# Patient Record
Sex: MALE | Race: WHITE | Employment: OTHER | URBAN - NONMETROPOLITAN AREA
[De-identification: names, ages, dates, MRNs, and addresses within clinical notes are randomized per-mention and may not be internally consistent; named-entity substitution may affect disease eponyms.]

---

## 2017-06-15 ENCOUNTER — OFFICE VISIT (OUTPATIENT)
Dept: FAMILY MEDICINE CLINIC | Age: 82
End: 2017-06-15
Payer: MEDICARE

## 2017-06-15 VITALS
DIASTOLIC BLOOD PRESSURE: 76 MMHG | SYSTOLIC BLOOD PRESSURE: 110 MMHG | HEIGHT: 68 IN | WEIGHT: 185 LBS | HEART RATE: 80 BPM | BODY MASS INDEX: 28.04 KG/M2

## 2017-06-15 DIAGNOSIS — N40.1 BENIGN PROSTATIC HYPERPLASIA WITH LOWER URINARY TRACT SYMPTOMS, UNSPECIFIED MORPHOLOGY: ICD-10-CM

## 2017-06-15 DIAGNOSIS — L43.9 LICHEN PLANUS: ICD-10-CM

## 2017-06-15 DIAGNOSIS — K27.9 PUD (PEPTIC ULCER DISEASE): Primary | ICD-10-CM

## 2017-06-15 DIAGNOSIS — K27.9 PUD (PEPTIC ULCER DISEASE): ICD-10-CM

## 2017-06-15 DIAGNOSIS — Z12.5 PROSTATE CANCER SCREENING: ICD-10-CM

## 2017-06-15 DIAGNOSIS — Z80.42 FAMILY HISTORY OF PROSTATE CANCER IN FATHER: ICD-10-CM

## 2017-06-15 DIAGNOSIS — K57.30 DIVERTICULOSIS OF LARGE INTESTINE WITHOUT PERFORATION OR ABSCESS WITHOUT BLEEDING: ICD-10-CM

## 2017-06-15 DIAGNOSIS — H61.22 IMPACTED CERUMEN OF LEFT EAR: ICD-10-CM

## 2017-06-15 PROBLEM — N40.0 BPH (BENIGN PROSTATIC HYPERPLASIA): Status: ACTIVE | Noted: 2017-06-15

## 2017-06-15 PROCEDURE — 4040F PNEUMOC VAC/ADMIN/RCVD: CPT | Performed by: FAMILY MEDICINE

## 2017-06-15 PROCEDURE — 99213 OFFICE O/P EST LOW 20 MIN: CPT | Performed by: FAMILY MEDICINE

## 2017-06-15 PROCEDURE — 1123F ACP DISCUSS/DSCN MKR DOCD: CPT | Performed by: FAMILY MEDICINE

## 2017-06-15 PROCEDURE — G8419 CALC BMI OUT NRM PARAM NOF/U: HCPCS | Performed by: FAMILY MEDICINE

## 2017-06-15 PROCEDURE — G8427 DOCREV CUR MEDS BY ELIG CLIN: HCPCS | Performed by: FAMILY MEDICINE

## 2017-06-15 PROCEDURE — 1036F TOBACCO NON-USER: CPT | Performed by: FAMILY MEDICINE

## 2017-06-15 PROCEDURE — 69209 REMOVE IMPACTED EAR WAX UNI: CPT | Performed by: FAMILY MEDICINE

## 2017-06-15 RX ORDER — TERAZOSIN 5 MG/1
5 CAPSULE ORAL NIGHTLY
COMMUNITY
End: 2017-12-19 | Stop reason: SDUPTHER

## 2017-06-15 RX ORDER — OMEPRAZOLE 20 MG/1
20 CAPSULE, DELAYED RELEASE ORAL DAILY
Qty: 90 CAPSULE | Refills: 3 | Status: SHIPPED | OUTPATIENT
Start: 2017-06-15 | End: 2017-12-19 | Stop reason: SDUPTHER

## 2017-06-15 RX ORDER — TRAMADOL HYDROCHLORIDE 50 MG/1
50 TABLET ORAL EVERY 6 HOURS PRN
COMMUNITY
End: 2017-06-15 | Stop reason: ALTCHOICE

## 2017-06-15 RX ORDER — HYDROCODONE BITARTRATE AND ACETAMINOPHEN 5; 325 MG/1; MG/1
1 TABLET ORAL EVERY 6 HOURS PRN
COMMUNITY
End: 2017-06-15 | Stop reason: ALTCHOICE

## 2017-06-15 RX ORDER — ASCORBIC ACID 500 MG
500 TABLET ORAL DAILY
COMMUNITY

## 2017-06-15 RX ORDER — M-VIT,TX,IRON,MINS/CALC/FOLIC 27MG-0.4MG
1 TABLET ORAL DAILY
COMMUNITY

## 2017-06-15 RX ORDER — OMEPRAZOLE 40 MG/1
40 CAPSULE, DELAYED RELEASE ORAL DAILY
Qty: 30 CAPSULE | Status: CANCELLED | OUTPATIENT
Start: 2017-06-15

## 2017-06-15 RX ORDER — CIPROFLOXACIN AND DEXAMETHASONE 3; 1 MG/ML; MG/ML
4 SUSPENSION/ DROPS AURICULAR (OTIC) 2 TIMES DAILY
Qty: 1 BOTTLE | Refills: 0 | Status: SHIPPED | OUTPATIENT
Start: 2017-06-15 | End: 2017-06-22

## 2017-06-15 RX ORDER — FERROUS SULFATE 325(65) MG
325 TABLET ORAL
COMMUNITY
End: 2017-06-15 | Stop reason: ALTCHOICE

## 2017-06-15 RX ORDER — TRIAMCINOLONE ACETONIDE 5 MG/G
CREAM TOPICAL 3 TIMES DAILY
COMMUNITY
End: 2017-06-15 | Stop reason: ALTCHOICE

## 2017-06-15 RX ORDER — OMEPRAZOLE 40 MG/1
40 CAPSULE, DELAYED RELEASE ORAL DAILY
COMMUNITY
End: 2017-06-15 | Stop reason: DRUGHIGH

## 2017-06-15 ASSESSMENT — ENCOUNTER SYMPTOMS
SORE THROAT: 0
ABDOMINAL PAIN: 0
SHORTNESS OF BREATH: 0
ABDOMINAL DISTENTION: 0
CHEST TIGHTNESS: 0
WHEEZING: 0

## 2017-06-15 ASSESSMENT — PATIENT HEALTH QUESTIONNAIRE - PHQ9
SUM OF ALL RESPONSES TO PHQ QUESTIONS 1-9: 0
2. FEELING DOWN, DEPRESSED OR HOPELESS: 0
1. LITTLE INTEREST OR PLEASURE IN DOING THINGS: 0
SUM OF ALL RESPONSES TO PHQ9 QUESTIONS 1 & 2: 0

## 2017-06-23 ENCOUNTER — TELEPHONE (OUTPATIENT)
Dept: FAMILY MEDICINE CLINIC | Age: 82
End: 2017-06-23

## 2017-06-23 DIAGNOSIS — H61.20 IMPACTED CERUMEN, UNSPECIFIED LATERALITY: Primary | ICD-10-CM

## 2017-07-17 ENCOUNTER — OFFICE VISIT (OUTPATIENT)
Dept: OTOLARYNGOLOGY | Age: 82
End: 2017-07-17
Payer: MEDICARE

## 2017-07-17 VITALS
HEIGHT: 68 IN | BODY MASS INDEX: 28.19 KG/M2 | SYSTOLIC BLOOD PRESSURE: 138 MMHG | DIASTOLIC BLOOD PRESSURE: 62 MMHG | WEIGHT: 186 LBS

## 2017-07-17 DIAGNOSIS — J30.0 VASOMOTOR RHINITIS: ICD-10-CM

## 2017-07-17 DIAGNOSIS — H91.93 HEARING LOSS, BILATERAL: ICD-10-CM

## 2017-07-17 DIAGNOSIS — H61.22 CERUMEN DEBRIS ON TYMPANIC MEMBRANE, LEFT: Primary | ICD-10-CM

## 2017-07-17 PROCEDURE — 1123F ACP DISCUSS/DSCN MKR DOCD: CPT | Performed by: OTOLARYNGOLOGY

## 2017-07-17 PROCEDURE — 99202 OFFICE O/P NEW SF 15 MIN: CPT | Performed by: OTOLARYNGOLOGY

## 2017-07-17 PROCEDURE — 4040F PNEUMOC VAC/ADMIN/RCVD: CPT | Performed by: OTOLARYNGOLOGY

## 2017-07-17 PROCEDURE — G8419 CALC BMI OUT NRM PARAM NOF/U: HCPCS | Performed by: OTOLARYNGOLOGY

## 2017-07-17 PROCEDURE — 1036F TOBACCO NON-USER: CPT | Performed by: OTOLARYNGOLOGY

## 2017-07-17 PROCEDURE — G8427 DOCREV CUR MEDS BY ELIG CLIN: HCPCS | Performed by: OTOLARYNGOLOGY

## 2017-07-17 RX ORDER — FLUTICASONE PROPIONATE 50 MCG
2 SPRAY, SUSPENSION (ML) NASAL DAILY
Qty: 1 BOTTLE | Refills: 5 | Status: SHIPPED | OUTPATIENT
Start: 2017-07-17 | End: 2018-06-19 | Stop reason: CLARIF

## 2017-12-19 ENCOUNTER — OFFICE VISIT (OUTPATIENT)
Dept: FAMILY MEDICINE CLINIC | Age: 82
End: 2017-12-19
Payer: MEDICARE

## 2017-12-19 VITALS
SYSTOLIC BLOOD PRESSURE: 136 MMHG | HEART RATE: 76 BPM | DIASTOLIC BLOOD PRESSURE: 68 MMHG | BODY MASS INDEX: 27.37 KG/M2 | WEIGHT: 180 LBS

## 2017-12-19 DIAGNOSIS — L43.9 LICHEN PLANUS: ICD-10-CM

## 2017-12-19 DIAGNOSIS — Z12.5 SCREENING PSA (PROSTATE SPECIFIC ANTIGEN): ICD-10-CM

## 2017-12-19 DIAGNOSIS — Z00.00 EXAMINATION, MEDICAL, GENERAL: ICD-10-CM

## 2017-12-19 DIAGNOSIS — N40.0 BENIGN PROSTATIC HYPERPLASIA, UNSPECIFIED WHETHER LOWER URINARY TRACT SYMPTOMS PRESENT: ICD-10-CM

## 2017-12-19 DIAGNOSIS — Z23 NEED FOR INFLUENZA VACCINATION: ICD-10-CM

## 2017-12-19 DIAGNOSIS — Z80.42 FAMILY HISTORY OF PROSTATE CANCER IN FATHER: ICD-10-CM

## 2017-12-19 DIAGNOSIS — K27.9 PUD (PEPTIC ULCER DISEASE): Primary | ICD-10-CM

## 2017-12-19 DIAGNOSIS — Z13.220 SCREENING, LIPID: ICD-10-CM

## 2017-12-19 DIAGNOSIS — K57.30 DIVERTICULOSIS OF LARGE INTESTINE WITHOUT PERFORATION OR ABSCESS WITHOUT BLEEDING: ICD-10-CM

## 2017-12-19 PROCEDURE — G8417 CALC BMI ABV UP PARAM F/U: HCPCS | Performed by: FAMILY MEDICINE

## 2017-12-19 PROCEDURE — 1036F TOBACCO NON-USER: CPT | Performed by: FAMILY MEDICINE

## 2017-12-19 PROCEDURE — G8484 FLU IMMUNIZE NO ADMIN: HCPCS | Performed by: FAMILY MEDICINE

## 2017-12-19 PROCEDURE — G8427 DOCREV CUR MEDS BY ELIG CLIN: HCPCS | Performed by: FAMILY MEDICINE

## 2017-12-19 PROCEDURE — 90662 IIV NO PRSV INCREASED AG IM: CPT | Performed by: FAMILY MEDICINE

## 2017-12-19 PROCEDURE — 1123F ACP DISCUSS/DSCN MKR DOCD: CPT | Performed by: FAMILY MEDICINE

## 2017-12-19 PROCEDURE — 4040F PNEUMOC VAC/ADMIN/RCVD: CPT | Performed by: FAMILY MEDICINE

## 2017-12-19 PROCEDURE — 99214 OFFICE O/P EST MOD 30 MIN: CPT | Performed by: FAMILY MEDICINE

## 2017-12-19 PROCEDURE — G0008 ADMIN INFLUENZA VIRUS VAC: HCPCS | Performed by: FAMILY MEDICINE

## 2017-12-19 RX ORDER — TERAZOSIN 5 MG/1
5 CAPSULE ORAL NIGHTLY
Qty: 90 CAPSULE | Refills: 3 | Status: SHIPPED | OUTPATIENT
Start: 2017-12-19 | End: 2018-12-20 | Stop reason: SDUPTHER

## 2017-12-19 RX ORDER — FINASTERIDE 5 MG/1
1 TABLET, FILM COATED ORAL
COMMUNITY
End: 2017-12-19 | Stop reason: CLARIF

## 2017-12-19 RX ORDER — BIOTIN 5 MG
1 TABLET ORAL
COMMUNITY

## 2017-12-19 RX ORDER — OMEPRAZOLE 20 MG/1
20 CAPSULE, DELAYED RELEASE ORAL DAILY
Qty: 90 CAPSULE | Refills: 3 | Status: SHIPPED | OUTPATIENT
Start: 2017-12-19 | End: 2018-03-28 | Stop reason: SDUPTHER

## 2017-12-19 RX ORDER — VITAMIN E 268 MG
1 CAPSULE ORAL
COMMUNITY
End: 2018-06-19 | Stop reason: CLARIF

## 2017-12-19 RX ORDER — ASPIRIN 81 MG/1
1 TABLET, CHEWABLE ORAL
COMMUNITY
End: 2018-06-19 | Stop reason: CLARIF

## 2017-12-19 ASSESSMENT — ENCOUNTER SYMPTOMS
SORE THROAT: 0
ABDOMINAL DISTENTION: 0
SHORTNESS OF BREATH: 0
WHEEZING: 0
ABDOMINAL PAIN: 0
CHEST TIGHTNESS: 0

## 2017-12-19 NOTE — PROGRESS NOTES
[address] [address] [address]  Dept: [phone]  Dept Fax: [phone]    Dante Archer is a 80 y.o. male who presents today for his medical conditions/complaints as noted below. Dante Archer is c/o of 6 Month Follow-Up; Benign Prostatic Hypertrophy (denies urinary frequency or urgency,urinary hesitancy.  nocturia twice a night); and Peptic Ulcer Disease (denies abd pain, nausea or vomiting, heartburn, rectal bleeding)      HPI:     HPI   Patient comes to office for a routine 6 month check up    PUD  Had a history of a bleeding ulcer about 3 years back  No pain  No bleeding or melanotic stools  Still taking omeprazole  No heartburn ; no dysphagia    Lichen planus  Proven by biopsy in past  No longer sees oralmaxillary surgery   No treatment ;though he has used a topical steroid in the past    Osteoarthritis  No longer sees orthopedics  Says he's fine   Though occasionally has some trigger fingers   BPH   Only taking hytrin; stopped finasteride a while back   No significant nocturia; no hesitancy, urgency, frequency    BP Readings from Last 3 Encounters:   12/19/17 136/68   07/17/17 138/62   06/15/17 110/76            (goal 120/80)    Past Medical History:   Diagnosis Date    BPH (benign prostatic hyperplasia)     Diverticulosis     Fracture of left hip (HCC)     Leukoplakia of oral mucosa     Microscopic hematuria     PUD (peptic ulcer disease)       Past Surgical History:   Procedure Laterality Date    BUNIONECTOMY      CARDIAC CATHETERIZATION  2008    CATARACT REMOVAL  2010    COLONOSCOPY  2005    severe diverticulosis    COLONOSCOPY  07/2014    severe diverticulosis and GI bleed    CYSTOURETHROSCOPY  2006    FOOT SURGERY  1998    OTHER SURGICAL HISTORY  2008    biopsy-leukoplakia-buccal mucosa    REVISION TOTAL KNEE ARTHROPLASTY  2014    TOTAL KNEE ARTHROPLASTY Right 10/2014    TOTAL KNEE ARTHROPLASTY Left 01/2016    UPPER

## 2018-03-28 DIAGNOSIS — K27.9 PUD (PEPTIC ULCER DISEASE): ICD-10-CM

## 2018-03-28 RX ORDER — OMEPRAZOLE 20 MG/1
20 CAPSULE, DELAYED RELEASE ORAL DAILY
Qty: 30 CAPSULE | Refills: 5 | Status: SHIPPED | OUTPATIENT
Start: 2018-03-28 | End: 2018-12-20 | Stop reason: SDUPTHER

## 2018-06-15 LAB
A/G RATIO: 1.3 RATIO
AGE FOR GFR: 90
ALBUMIN: 3.5 G/DL
ALK PHOSPHATASE: 76 UNITS/L
ALT SERPL-CCNC: 27 UNITS/L
ANION GAP SERPL CALCULATED.3IONS-SCNC: 8 MMOL/L
AST SERPL-CCNC: 22 UNITS/L
BASOPHILS # BLD: 0.04 THOU/MM3
BILIRUB SERPL-MCNC: 0.8 MG/DL
BUN BLDV-MCNC: 14 MG/DL
CALCIUM SERPL-MCNC: 8.9 MG/DL
CHLORIDE BLD-SCNC: 107 MMOL/L
CHOLESTEROL/HDL RATIO: 2.6 RATIO
CHOLESTEROL: 158 MG/DL
CO2: 28 MMOL/L
CREAT SERPL-MCNC: 0.8 MG/DL
DIFFERENTIAL: AUTOMATED DIFF
EGFR BF: 82 ML/MIN/1.73 M2
EGFR BM: 110 ML/MIN/1.73 M2
EGFR WF: 67 ML/MIN/1.73 M2
EGFR WM: 91 ML/MIN/1.73 M2
EOSINOPHIL # BLD: 0.09 THOU/MM3
GLOBULIN: 2.6 G/DL
GLUCOSE: 99 MG/DL
HCT VFR BLD CALC: 36.4 %
HDL, DIRECT: 60 MG/DL
HEMOGLOBIN: 13.3 G/DL
LDL CHOLESTEROL CALCULATED: 87.2 MG/DL
LYMPHOCYTES # BLD: 1.09 THOU/MM3
MCH RBC QN AUTO: 33.9 PG
MCHC RBC AUTO-ENTMCNC: 36.6 G/DL
MCV RBC AUTO: 92.6 FL
MONOCYTES # BLD: 0.42 THOU/MM3
NEUTROPHILS: 2.88 THOU/MM3
PDW BLD-RTO: 11.9 %
PLATELET # BLD: 183 THOU/MM3
PMV BLD AUTO: 6.7 FL
POTASSIUM SERPL-SCNC: 4.3 MMOL/L
RBC # BLD: 3.93 M/UL
SCREENING PSA: 1.98 NG/ML
SODIUM BLD-SCNC: 139 MMOL/L
TOTAL PROTEIN: 6.1 G/DL
TRIGL SERPL-MCNC: 54 MG/DL
VLDLC SERPL CALC-MCNC: 11 MG/DL
WBC # BLD: 4.53 THOU/ML3

## 2018-06-19 ENCOUNTER — OFFICE VISIT (OUTPATIENT)
Dept: FAMILY MEDICINE CLINIC | Age: 83
End: 2018-06-19
Payer: MEDICARE

## 2018-06-19 VITALS
HEART RATE: 64 BPM | DIASTOLIC BLOOD PRESSURE: 80 MMHG | BODY MASS INDEX: 26.91 KG/M2 | SYSTOLIC BLOOD PRESSURE: 120 MMHG | WEIGHT: 177 LBS

## 2018-06-19 DIAGNOSIS — Z80.42 FAMILY HISTORY OF PROSTATE CANCER IN FATHER: ICD-10-CM

## 2018-06-19 DIAGNOSIS — K27.9 PUD (PEPTIC ULCER DISEASE): Primary | ICD-10-CM

## 2018-06-19 DIAGNOSIS — K21.9 GASTROESOPHAGEAL REFLUX DISEASE, ESOPHAGITIS PRESENCE NOT SPECIFIED: ICD-10-CM

## 2018-06-19 DIAGNOSIS — L43.9 LICHEN PLANUS: ICD-10-CM

## 2018-06-19 DIAGNOSIS — N40.0 BENIGN PROSTATIC HYPERPLASIA, UNSPECIFIED WHETHER LOWER URINARY TRACT SYMPTOMS PRESENT: ICD-10-CM

## 2018-06-19 PROCEDURE — 1036F TOBACCO NON-USER: CPT | Performed by: FAMILY MEDICINE

## 2018-06-19 PROCEDURE — 1123F ACP DISCUSS/DSCN MKR DOCD: CPT | Performed by: FAMILY MEDICINE

## 2018-06-19 PROCEDURE — 99213 OFFICE O/P EST LOW 20 MIN: CPT | Performed by: FAMILY MEDICINE

## 2018-06-19 PROCEDURE — 4040F PNEUMOC VAC/ADMIN/RCVD: CPT | Performed by: FAMILY MEDICINE

## 2018-06-19 PROCEDURE — G8417 CALC BMI ABV UP PARAM F/U: HCPCS | Performed by: FAMILY MEDICINE

## 2018-06-19 PROCEDURE — G8427 DOCREV CUR MEDS BY ELIG CLIN: HCPCS | Performed by: FAMILY MEDICINE

## 2018-06-19 ASSESSMENT — ENCOUNTER SYMPTOMS
CHEST TIGHTNESS: 0
SHORTNESS OF BREATH: 0
ABDOMINAL PAIN: 0
ABDOMINAL DISTENTION: 0
WHEEZING: 0
SORE THROAT: 0

## 2018-06-19 ASSESSMENT — PATIENT HEALTH QUESTIONNAIRE - PHQ9
1. LITTLE INTEREST OR PLEASURE IN DOING THINGS: 0
SUM OF ALL RESPONSES TO PHQ QUESTIONS 1-9: 0
2. FEELING DOWN, DEPRESSED OR HOPELESS: 0
SUM OF ALL RESPONSES TO PHQ9 QUESTIONS 1 & 2: 0

## 2018-08-15 ENCOUNTER — OFFICE VISIT (OUTPATIENT)
Dept: OTOLARYNGOLOGY | Age: 83
End: 2018-08-15
Payer: MEDICARE

## 2018-08-15 VITALS
DIASTOLIC BLOOD PRESSURE: 70 MMHG | HEART RATE: 72 BPM | BODY MASS INDEX: 25.34 KG/M2 | WEIGHT: 177 LBS | HEIGHT: 70 IN | SYSTOLIC BLOOD PRESSURE: 110 MMHG | RESPIRATION RATE: 14 BRPM

## 2018-08-15 DIAGNOSIS — R49.0 DYSPHONIA: ICD-10-CM

## 2018-08-15 DIAGNOSIS — H61.22 CERUMEN DEBRIS ON TYMPANIC MEMBRANE, LEFT: Primary | ICD-10-CM

## 2018-08-15 DIAGNOSIS — J30.0 VASOMOTOR RHINITIS: ICD-10-CM

## 2018-08-15 DIAGNOSIS — J38.7 PRESBYLARYNX: ICD-10-CM

## 2018-08-15 DIAGNOSIS — H91.90 HEARING LOSS, UNSPECIFIED HEARING LOSS TYPE, UNSPECIFIED LATERALITY: ICD-10-CM

## 2018-08-15 PROCEDURE — 31575 DIAGNOSTIC LARYNGOSCOPY: CPT | Performed by: OTOLARYNGOLOGY

## 2018-09-11 ENCOUNTER — NURSE ONLY (OUTPATIENT)
Dept: FAMILY MEDICINE CLINIC | Age: 83
End: 2018-09-11
Payer: MEDICARE

## 2018-09-11 DIAGNOSIS — Z23 NEED FOR INFLUENZA VACCINATION: Primary | ICD-10-CM

## 2018-09-11 PROCEDURE — G0008 ADMIN INFLUENZA VIRUS VAC: HCPCS | Performed by: FAMILY MEDICINE

## 2018-09-11 PROCEDURE — 90662 IIV NO PRSV INCREASED AG IM: CPT | Performed by: FAMILY MEDICINE

## 2018-12-20 ENCOUNTER — OFFICE VISIT (OUTPATIENT)
Dept: FAMILY MEDICINE CLINIC | Age: 83
End: 2018-12-20
Payer: MEDICARE

## 2018-12-20 VITALS
HEART RATE: 79 BPM | WEIGHT: 174 LBS | DIASTOLIC BLOOD PRESSURE: 78 MMHG | SYSTOLIC BLOOD PRESSURE: 130 MMHG | BODY MASS INDEX: 24.91 KG/M2 | OXYGEN SATURATION: 98 % | HEIGHT: 70 IN

## 2018-12-20 DIAGNOSIS — K27.9 PUD (PEPTIC ULCER DISEASE): Primary | ICD-10-CM

## 2018-12-20 DIAGNOSIS — Z13.220 SCREENING, LIPID: ICD-10-CM

## 2018-12-20 DIAGNOSIS — Z80.42 FAMILY HISTORY OF PROSTATE CANCER IN FATHER: ICD-10-CM

## 2018-12-20 DIAGNOSIS — N40.0 BENIGN PROSTATIC HYPERPLASIA, UNSPECIFIED WHETHER LOWER URINARY TRACT SYMPTOMS PRESENT: ICD-10-CM

## 2018-12-20 DIAGNOSIS — K21.9 GASTROESOPHAGEAL REFLUX DISEASE, ESOPHAGITIS PRESENCE NOT SPECIFIED: ICD-10-CM

## 2018-12-20 DIAGNOSIS — Z12.5 SCREENING PSA (PROSTATE SPECIFIC ANTIGEN): ICD-10-CM

## 2018-12-20 DIAGNOSIS — L43.9 LICHEN PLANUS: ICD-10-CM

## 2018-12-20 PROCEDURE — 1101F PT FALLS ASSESS-DOCD LE1/YR: CPT | Performed by: FAMILY MEDICINE

## 2018-12-20 PROCEDURE — 1123F ACP DISCUSS/DSCN MKR DOCD: CPT | Performed by: FAMILY MEDICINE

## 2018-12-20 PROCEDURE — G8427 DOCREV CUR MEDS BY ELIG CLIN: HCPCS | Performed by: FAMILY MEDICINE

## 2018-12-20 PROCEDURE — G8482 FLU IMMUNIZE ORDER/ADMIN: HCPCS | Performed by: FAMILY MEDICINE

## 2018-12-20 PROCEDURE — G8420 CALC BMI NORM PARAMETERS: HCPCS | Performed by: FAMILY MEDICINE

## 2018-12-20 PROCEDURE — 1036F TOBACCO NON-USER: CPT | Performed by: FAMILY MEDICINE

## 2018-12-20 PROCEDURE — 4040F PNEUMOC VAC/ADMIN/RCVD: CPT | Performed by: FAMILY MEDICINE

## 2018-12-20 PROCEDURE — 99214 OFFICE O/P EST MOD 30 MIN: CPT | Performed by: FAMILY MEDICINE

## 2018-12-20 RX ORDER — TERAZOSIN 5 MG/1
5 CAPSULE ORAL NIGHTLY
Qty: 90 CAPSULE | Refills: 3 | Status: SHIPPED | OUTPATIENT
Start: 2018-12-20 | End: 2019-12-12 | Stop reason: SDUPTHER

## 2018-12-20 RX ORDER — OMEPRAZOLE 20 MG/1
20 CAPSULE, DELAYED RELEASE ORAL DAILY
Qty: 30 CAPSULE | Refills: 5 | Status: SHIPPED | OUTPATIENT
Start: 2018-12-20 | End: 2019-04-29 | Stop reason: SDUPTHER

## 2018-12-26 NOTE — PROGRESS NOTES
both negative bilaterally. Brady's test is positive bilaterally. He has frozen shoulders bilaterally, a little bit more so on the right than the left. It cannot raise past 90 degrees. He has a lot of difficulty with external rotation also. Lymphadenopathy:     He has no cervical adenopathy. He has no axillary adenopathy. Skin: No rash noted. Vitals reviewed. Assessment:      Diagnosis Orders   1. Family history of prostate cancer in father     2. PUD (peptic ulcer disease)  omeprazole (PRILOSEC) 20 MG delayed release capsule    CBC Auto Differential   3. Benign prostatic hyperplasia, unspecified whether lower urinary tract symptoms present  terazosin (HYTRIN) 5 MG capsule   4. Lichen planus     5. Gastroesophageal reflux disease, esophagitis presence not specified  Comprehensive Metabolic Panel, Fasting   6. Screening PSA (prostate specific antigen)  PSA Screening   7. Screening, lipid  Lipid Panel        POC Testing Results (If Applicable):  No results found for this visit on 12/20/18. Plan: At this time, his current medications are continued. We will get a CBC, comprehensive metabolic profile, lipid profile, and PSA in 6 months. He drinks a lot of coffee. I suggest that he try to increase fluids as the only reason that might cause his cramping due to some dehydration though that is dubious. There is nothing much more to be done for his shoulders unless he elects to. I would not put him under exam under anesthesia at this age. He functions very well. Recheck in 6 months.     Orders Given:  Orders Placed This Encounter   Procedures    Comprehensive Metabolic Panel, Fasting     Standing Status:   Future     Standing Expiration Date:   12/20/2019    Lipid Panel     Standing Status:   Future     Standing Expiration Date:   12/20/2019     Order Specific Question:   Is Patient Fasting?/# of Hours     Answer:   Yes, 12 hours    CBC Auto Differential     Standing Status:   Future

## 2018-12-27 ASSESSMENT — ENCOUNTER SYMPTOMS
CHEST TIGHTNESS: 0
ABDOMINAL DISTENTION: 0
SHORTNESS OF BREATH: 0
ABDOMINAL PAIN: 0
WHEEZING: 0
BLOOD IN STOOL: 0

## 2019-04-29 DIAGNOSIS — K27.9 PUD (PEPTIC ULCER DISEASE): ICD-10-CM

## 2019-04-29 RX ORDER — OMEPRAZOLE 20 MG/1
20 CAPSULE, DELAYED RELEASE ORAL DAILY
Qty: 30 CAPSULE | Refills: 0 | Status: SHIPPED | OUTPATIENT
Start: 2019-04-29 | End: 2019-06-21 | Stop reason: SDUPTHER

## 2019-04-29 NOTE — TELEPHONE ENCOUNTER
Pillo Askew is calling to request a refill on the following medication(s):  Requested Prescriptions     Pending Prescriptions Disp Refills    omeprazole (PRILOSEC) 20 MG delayed release capsule 30 capsule 0     Sig: Take 1 capsule by mouth daily       Last Visit Date (If Applicable):  24/12/8476    Next Visit Date:    6/21/2019

## 2019-06-18 LAB
A/G RATIO: 1.3 RATIO
AGE FOR GFR: 91
ALBUMIN: 3.7 G/DL (ref 3.5–5)
ALK PHOSPHATASE: 91 UNITS/L (ref 38–126)
ALT SERPL-CCNC: 15 UNITS/L (ref 21–72)
ANION GAP SERPL CALCULATED.3IONS-SCNC: 10 MMOL/L
AST SERPL-CCNC: 22 UNITS/L (ref 17–59)
BASOPHILS # BLD: 0.05 THOU/MM3 (ref 0–0.3)
BILIRUB SERPL-MCNC: 0.5 MG/DL (ref 0.2–1.3)
BUN BLDV-MCNC: 16 MG/DL (ref 9–20)
CALCIUM SERPL-MCNC: 9 MG/DL (ref 8.4–10.2)
CHLORIDE BLD-SCNC: 103 MMOL/L (ref 98–120)
CHOLESTEROL/HDL RATIO: 2.9 RATIO (ref 0–4.5)
CHOLESTEROL: 162 MG/DL (ref 50–200)
CO2: 28 MMOL/L (ref 22–31)
CREAT SERPL-MCNC: 0.7 MG/DL (ref 0.7–1.3)
DIFFERENTIAL: AUTOMATED DIFF
EGFR BF: 95 ML/MIN/1.73 M2
EGFR BM: 128 ML/MIN/1.73 M2
EGFR WF: 78 ML/MIN/1.73 M2
EGFR WM: 106 ML/MIN/1.73 M2
EOSINOPHIL # BLD: 0.17 THOU/MM3 (ref 0–1.1)
GLOBULIN: 2.8 G/DL
GLUCOSE: 105 MG/DL (ref 75–110)
HCT VFR BLD CALC: 37.8 % (ref 42–52)
HDL, DIRECT: 55 MG/DL (ref 36–68)
HEMOGLOBIN: 13 G/DL (ref 13.8–17)
LDL CHOLESTEROL CALCULATED: 93.8 MG/DL (ref 0–160)
LYMPHOCYTES # BLD: 1.19 THOU/MM3 (ref 1–5.5)
MCH RBC QN AUTO: 31.2 PG (ref 28.5–32)
MCHC RBC AUTO-ENTMCNC: 34.3 G/DL (ref 32–37)
MCV RBC AUTO: 91.1 FL (ref 80–94)
MONOCYTES # BLD: 0.43 THOU/MM3 (ref 0.1–1)
NEUTROPHILS: 4.08 THOU/MM3 (ref 2–8.1)
PDW BLD-RTO: 11.5 % (ref 10–15)
PLATELET # BLD: 302 THOU/MM3 (ref 130–400)
PMV BLD AUTO: 5.8 FL (ref 7.4–11)
POTASSIUM SERPL-SCNC: 4.7 MMOL/L (ref 3.6–5)
RBC # BLD: 4.15 M/UL (ref 4.7–6.1)
SCREENING PSA: 2.47 NG/ML (ref 0–4)
SODIUM BLD-SCNC: 136 MMOL/L (ref 135–145)
TOTAL PROTEIN: 6.5 G/DL (ref 6.3–8.2)
TRIGL SERPL-MCNC: 66 MG/DL (ref 10–250)
VLDLC SERPL CALC-MCNC: 13 MG/DL (ref 0–40)
WBC # BLD: 5.91 THOU/ML3 (ref 4.8–10)

## 2019-06-21 ENCOUNTER — OFFICE VISIT (OUTPATIENT)
Dept: FAMILY MEDICINE CLINIC | Age: 84
End: 2019-06-21
Payer: MEDICARE

## 2019-06-21 VITALS
OXYGEN SATURATION: 98 % | BODY MASS INDEX: 25.25 KG/M2 | WEIGHT: 176 LBS | SYSTOLIC BLOOD PRESSURE: 110 MMHG | HEART RATE: 104 BPM | DIASTOLIC BLOOD PRESSURE: 60 MMHG

## 2019-06-21 DIAGNOSIS — K27.9 PUD (PEPTIC ULCER DISEASE): ICD-10-CM

## 2019-06-21 DIAGNOSIS — Z80.42 FAMILY HISTORY OF PROSTATE CANCER IN FATHER: ICD-10-CM

## 2019-06-21 DIAGNOSIS — N40.0 BENIGN PROSTATIC HYPERPLASIA, UNSPECIFIED WHETHER LOWER URINARY TRACT SYMPTOMS PRESENT: Primary | ICD-10-CM

## 2019-06-21 DIAGNOSIS — L43.9 LICHEN PLANUS: ICD-10-CM

## 2019-06-21 PROCEDURE — 1123F ACP DISCUSS/DSCN MKR DOCD: CPT | Performed by: FAMILY MEDICINE

## 2019-06-21 PROCEDURE — G8419 CALC BMI OUT NRM PARAM NOF/U: HCPCS | Performed by: FAMILY MEDICINE

## 2019-06-21 PROCEDURE — G8427 DOCREV CUR MEDS BY ELIG CLIN: HCPCS | Performed by: FAMILY MEDICINE

## 2019-06-21 PROCEDURE — 99214 OFFICE O/P EST MOD 30 MIN: CPT | Performed by: FAMILY MEDICINE

## 2019-06-21 PROCEDURE — 4040F PNEUMOC VAC/ADMIN/RCVD: CPT | Performed by: FAMILY MEDICINE

## 2019-06-21 PROCEDURE — 1036F TOBACCO NON-USER: CPT | Performed by: FAMILY MEDICINE

## 2019-06-21 RX ORDER — OMEPRAZOLE 20 MG/1
20 CAPSULE, DELAYED RELEASE ORAL DAILY
Qty: 90 CAPSULE | Refills: 3 | Status: SHIPPED | OUTPATIENT
Start: 2019-06-21 | End: 2019-12-12 | Stop reason: SDUPTHER

## 2019-06-21 ASSESSMENT — PATIENT HEALTH QUESTIONNAIRE - PHQ9
SUM OF ALL RESPONSES TO PHQ QUESTIONS 1-9: 0
1. LITTLE INTEREST OR PLEASURE IN DOING THINGS: 0
SUM OF ALL RESPONSES TO PHQ9 QUESTIONS 1 & 2: 0
SUM OF ALL RESPONSES TO PHQ QUESTIONS 1-9: 0
2. FEELING DOWN, DEPRESSED OR HOPELESS: 0

## 2019-06-21 ASSESSMENT — ENCOUNTER SYMPTOMS
RHINORRHEA: 1
SINUS PAIN: 0
SORE THROAT: 0
SINUS PRESSURE: 0
BLOOD IN STOOL: 0
TROUBLE SWALLOWING: 0
ABDOMINAL PAIN: 0
WHEEZING: 0
BACK PAIN: 0
SHORTNESS OF BREATH: 0
COUGH: 0

## 2019-06-21 NOTE — PROGRESS NOTES
[address]  Dept: [phone]  DeptFax: [phone]    Angella Patricia is a80 y.o. male who presents today for his medical conditions/complaints as noted below some of his problems  Angella Patricia is c/o of 6 Month Follow-Up (pt c/o having muscle spasms in hands worst in left, and when urinates says notices granule like residue in toilet water says happens when more active)      HPI:     HPI   Patient is a 80-year-old actually 80-year-old male comes in for a routine six-month checkup. His problems include and his complaints include    Peptic ulcer disease/GI bleed/GERD  Having suffered a GI bleed 10 years ago. Anoscopy is proved healing in the past.  Remains on omeprazole and needs a refill. He denies heartburn. Denies dysphagia. He has no stomach pain. We repeated his CBC prior to this visit with mild anemia 13. And a low RBC count. No microcytic indices. He has no melena or blood per rectum. BPH and family history of prostate cancer in father  To treat his BPH with Hytrin. His symptoms seem to be well controlled. Nocturia once or twice a night. Stream is about the same. His PSA was 2.47. Up slightly from a year ago when it was 1.98. Complaining of back pain really. No hematuria. He does complain that when he urinates he notices that there is some sort of  \"film\" or debris on top of the toilet water. No blood. Nothing sinks. No stones. May be a little bit more prominent after activity. .  Lichen planus  Is a history of this in the past.  He notices no oral lesions. He has never smoked or chewed tobacco use smokeless tobacco.    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 We noticed this his last visit in December. He has very limited range of motion of the shoulders particularly abduction. More so on the right. He did fall for 5 years ago injured his shoulder.   Had some x-rays which I could not find.  May be his shoulder is been worse since then. He also complains that his hands cramp. And that he has a lot of postnasal drip. That he seems to cough up in the morning. He is tried some steroid nasal sprays. With no success. Most recent lipid panel films have a total cholesterol 162 HDL 59 LDL 93. Not on any medication. His fasting glucose was 105. CBC showed mild anemia with a hemoglobin of 13. Hematocrit 37.8.     BP Readings from Last 3 Encounters:   19 110/60   18 130/78   08/15/18 110/70            (goal 120/80)    Past Medical History:   Diagnosis Date    BPH (benign prostatic hyperplasia)     Diverticulosis     Fracture of left hip (HCC)     Leukoplakia of oral mucosa     Microscopic hematuria     PUD (peptic ulcer disease)       Past Surgical History:   Procedure Laterality Date    BUNIONECTOMY      CARDIAC CATHETERIZATION      CATARACT REMOVAL      COLONOSCOPY      severe diverticulosis    COLONOSCOPY  2014    severe diverticulosis and GI bleed    CYSTOURETHROSCOPY  2006   [place] [address]    OTHER SURGICAL HISTORY      biopsy-leukoplakia-buccal mucosa    REVISION TOTAL KNEE ARTHROPLASTY      TOTAL KNEE ARTHROPLASTY Right 10/2014    TOTAL KNEE ARTHROPLASTY Left 2016    UPPER GASTROINTESTINAL ENDOSCOPY  2009    esophageal ulcer healed excision external hemorrhoids    UPPER GASTROINTESTINAL ENDOSCOPY  10/2014     Family History   Problem Relation Age of Onset    Cancer Father         pancreas    Cancer Brother         prostate     Social History     Tobacco Use    Smoking status: Former Smoker     Last attempt to quit: 6/15/1987     Years since quittin.0    Smokeless tobacco: Former User     Quit date: 1997   Substance Use Topics    Alcohol use: No        Current Outpatient Medications   Medication Sig Dispense Refill    omeprazole (PRILOSEC) 20 MG delayed release capsule Take 1 capsule by mouth daily 90 capsule 3    terazosin (HYTRIN) 5 MG capsule Take 1 capsule by mouth nightly 90 capsule 3    Misc Natural Products (GLUCOSAMINE CHONDROITIN ADV) TABS Take 1 tablet by mouth      calcium citrate-vitamin d 250-200 MG-UNIT TABS Take 1 tablet by mouth      Ascorbic Acid (VITAMIN C) 500 MG tablet Take 500 mg by mouth daily      Multiple Vitamins-Minerals (THERAPEUTIC MULTIVITAMIN-MINERALS) tablet Take 1 tablet by mouth daily       No current facility-administered medications for this visit. Allergies   Allergen Reactions    Percocet [Oxycodone-Acetaminophen]      Nausea         Health Maintenance   Topic Date Due    Annual Wellness Visit (AWV)  05/01/1991    DTaP/Tdap/Td vaccine (1 - Tdap) 11/17/2007    Shingles Vaccine (3 of 3) 12/05/2018    Flu vaccine  Completed    Pneumococcal 65+ years Vaccine  Completed       Lab Results   Component Value Date    K 4.7 06/18/2019    CREATININE 0.7 06/18/2019    AST 22 06/18/2019    ALT 15 06/18/2019    HCT 37.8 06/18/2019    GLUCOSE 105 06/18/2019    CALCIUM 9.0 06/18/2019      Lab Results   Component Value Date    CHOL 162 06/18/2019    TRIG 66 06/18/2019       Subjective:      Review of Systems   Constitutional: Negative for activity change, appetite change, diaphoresis and fever. HENT: Positive for congestion, postnasal drip and rhinorrhea. Negative for sinus pressure, sinus pain, sore throat and trouble swallowing. Eyes: Negative for visual disturbance. Respiratory: Negative for cough, shortness of breath and wheezing. Cardiovascular: Negative for chest pain, palpitations and leg swelling. Gastrointestinal: Negative for abdominal pain and blood in stool. Genitourinary: Negative for difficulty urinating, dysuria, flank pain, frequency, hematuria and urgency. Musculoskeletal: Positive for arthralgias. Negative for back pain and gait problem. Neurological: Negative. Psychiatric/Behavioral: Negative.         Objective:     /60   Pulse 104   Wt 176 lb (79.8 kg)   SpO2 98%   BMI 25.25 kg/m²     Physical Exam   Constitutional: He appears well-developed and well-nourished. No distress. HENT:   Right Ear: Tympanic membrane normal.   Left Ear: Tympanic membrane normal.   Nose: No mucosal edema or rhinorrhea. Right sinus exhibits no maxillary sinus tenderness and no frontal sinus tenderness. Left sinus exhibits no maxillary sinus tenderness and no frontal sinus tenderness. Mouth/Throat: Oropharynx is clear and moist and mucous membranes are normal. No oral lesions. Normal dentition. No dental caries. markedly receding gum lines noted   Neck: Neck supple. Carotid bruit is not present. No thyromegaly present. Cardiovascular: Normal rate, regular rhythm, S1 normal, S2 normal and normal heart sounds. No murmur heard. Pulmonary/Chest: Breath sounds normal. He has no wheezes. He has no rhonchi. He has no rales. Abdominal: Soft. Bowel sounds are normal. There is no hepatosplenomegaly. There is no tenderness. Musculoskeletal: He exhibits no edema. Right shoulder: He exhibits decreased range of motion. He exhibits no bony tenderness. Left shoulder: He exhibits decreased range of motion. He exhibits no tenderness, no bony tenderness and no crepitus. Tinel's and Phalen's are both negative bilaterally. Brady's test is positive bilaterally. He has frozen shoulders bilaterally, a little bit more so on the right than the left. It cannot raise past 90 degrees. He has a lot of difficulty with external rotation also. Lymphadenopathy:     He has no cervical adenopathy. He has no axillary adenopathy. Skin: No rash noted. Vitals reviewed. Assessment:      Diagnosis Orders   1. Benign prostatic hyperplasia, unspecified whether lower urinary tract symptoms present     2. PUD (peptic ulcer disease)  omeprazole (PRILOSEC) 20 MG delayed release capsule   3. Family history of prostate cancer in father     3.  Lichen planus POC Testing Results (If Applicable):  No results found for this visit on 06/21/19. Plan: We will continue his current medications. Omeprazole is refilled. We discussed his shoulders. All those range of motion is limited he does not have much pain function is doing well. Perhaps he could try some wall climbing exercises. Will consider referral next visit at next visit. Regards to his hands suggested he do some stretching of his hands and wrists every day. At his last visit we discussed taking 20 fluids to see if that would help. Offered urinalysis. He defers at this time. Also at this point time I do not think we need to define any more PSAs. He is to recheck with me in 6 months sooner if any problems    Orders Given:  No orders of the defined types were placed in this encounter. Prescriptions:    Orders Placed This Encounter   Medications    omeprazole (PRILOSEC) 20 MG delayed release capsule     Sig: Take 1 capsule by mouth daily     Dispense:  90 capsule     Refill:  3        Return in about 6 months (around 12/21/2019). Electronically signed by Shun Wolf MD on6/21/2019. **This report has been created using voice recognition software. It may contain minor errors which are inherent in voice recognition technology. **

## 2019-06-21 NOTE — PROGRESS NOTES
[address] [address] [address]  Dept: [phone]  DeptFax: [phone]    Raphael Mcintosh is a80 y.o. male who presents today for his medical conditions/complaints as noted below some of his problems  Raphael Mcintosh is c/o of 6 Month Follow-Up (pt c/o having muscle spasms in hands worst in left, and when urinates says notices granule like residue in toilet water says happens when more active)      HPI:     HPI   Patient is a 68-year-old actually 68-year-old male comes in for    Peptic ulcer disease/GI bleed/GERD        BPH and family history of prostate cancer in father  PSA is 2.47  He remains on Hytrin. Lichen planus      Frozen shoulder      Still has a lot of trouble with post nasal drip ; coughs it up   Hands contract       Most recent lipid panel films have a total cholesterol 162 HDL 59 LDL 93. Not on any medication. His fasting glucose was 105. CBC showed mild anemia with a hemoglobin of 13. Hematocrit 37.8.     BP Readings from Last 3 Encounters:   06/21/19 110/60   12/20/18 130/78   08/15/18 110/70            (goal 120/80)    Past Medical History:   Diagnosis Date    BPH (benign prostatic hyperplasia)     Diverticulosis     Fracture of left hip (HCC)     Leukoplakia of oral mucosa     Microscopic hematuria     PUD (peptic ulcer disease)       Past Surgical History:   Procedure Laterality Date    BUNIONECTOMY      CARDIAC CATHETERIZATION  2008    CATARACT REMOVAL  2010    COLONOSCOPY  2005    severe diverticulosis    COLONOSCOPY  07/2014    severe diverticulosis and GI bleed    CYSTOURETHROSCOPY  2006    FOOT SURGERY  1998    OTHER SURGICAL HISTORY  2008    biopsy-leukoplakia-buccal mucosa    REVISION TOTAL KNEE ARTHROPLASTY  2014    TOTAL KNEE ARTHROPLASTY Right 10/2014    TOTAL KNEE ARTHROPLASTY Left 01/2016    UPPER GASTROINTESTINAL ENDOSCOPY  2009    esophageal ulcer healed excision external hemorrhoids    UPPER GASTROINTESTINAL ENDOSCOPY  10/2014     Family History   Problem Relation Age of Onset    Cancer Father         pancreas    Cancer Brother         prostate     Social History     Tobacco Use    Smoking status: Former Smoker     Last attempt to quit: 6/15/1987     Years since quittin.0    Smokeless tobacco: Former User     Quit date: 1997   Substance Use Topics    Alcohol use: No        Current Outpatient Medications   Medication Sig Dispense Refill    omeprazole (PRILOSEC) 20 MG delayed release capsule Take 1 capsule by mouth daily 30 capsule 0    terazosin (HYTRIN) 5 MG capsule Take 1 capsule by mouth nightly 90 capsule 3    Misc Natural Products (GLUCOSAMINE CHONDROITIN ADV) TABS Take 1 tablet by mouth      calcium citrate-vitamin d 250-200 MG-UNIT TABS Take 1 tablet by mouth      Ascorbic Acid (VITAMIN C) 500 MG tablet Take 500 mg by mouth daily      Multiple Vitamins-Minerals (THERAPEUTIC MULTIVITAMIN-MINERALS) tablet Take 1 tablet by mouth daily       No current facility-administered medications for this visit. Allergies   Allergen Reactions    Percocet [Oxycodone-Acetaminophen]      Nausea         Health Maintenance   Topic Date Due    Annual Wellness Visit (AWV)  1991    DTaP/Tdap/Td vaccine (1 - Tdap) 2007    Shingles Vaccine (3 of 3) 2018    Flu vaccine  Completed    Pneumococcal 65+ years Vaccine  Completed       Lab Results   Component Value Date    K 4.7 2019    CREATININE 0.7 2019    AST 22 2019    ALT 15 2019    HCT 37.8 2019    GLUCOSE 105 2019    CALCIUM 9.0 2019      Lab Results   Component Value Date    CHOL 162 2019    TRIG 66 2019       Subjective:      Review of Systems    Objective:     /60   Pulse 104   Wt 176 lb (79.8 kg)   SpO2 98%   BMI 25.25 kg/m²     Physical Exam    Assessment:      Diagnosis Orders   1.  PUD (peptic ulcer disease)  omeprazole (PRILOSEC) 20 MG delayed release capsule            POC Testing Results (If Applicable):  No results found for this visit on 06/21/19. Plan:         Orders Given:  No orders of the defined types were placed in this encounter. Prescriptions:    No orders of the defined types were placed in this encounter. No follow-ups on file. Electronically signed by Natalia Lima MD on6/21/2019. **This report has been created using voice recognition software. It may contain minor errors which are inherent in voice recognition technology. **

## 2019-08-29 ENCOUNTER — NURSE ONLY (OUTPATIENT)
Dept: FAMILY MEDICINE CLINIC | Age: 84
End: 2019-08-29
Payer: MEDICARE

## 2019-08-29 DIAGNOSIS — Z23 NEED FOR INFLUENZA VACCINATION: Primary | ICD-10-CM

## 2019-08-29 PROCEDURE — 90653 IIV ADJUVANT VACCINE IM: CPT | Performed by: FAMILY MEDICINE

## 2019-08-29 PROCEDURE — G0008 ADMIN INFLUENZA VIRUS VAC: HCPCS | Performed by: FAMILY MEDICINE

## 2019-08-29 NOTE — PROGRESS NOTES
After obtaining consent, and per orders of Dr. Sherly Lopez, injection of influenza given in Left deltoid by Crystal Lester. Patient instructed to remain in clinic for 20 minutes afterwards, and to report any adverse reaction to me immediately.

## 2019-12-12 ENCOUNTER — OFFICE VISIT (OUTPATIENT)
Dept: FAMILY MEDICINE CLINIC | Age: 84
End: 2019-12-12
Payer: MEDICARE

## 2019-12-12 VITALS
DIASTOLIC BLOOD PRESSURE: 66 MMHG | HEART RATE: 79 BPM | BODY MASS INDEX: 25.25 KG/M2 | SYSTOLIC BLOOD PRESSURE: 124 MMHG | OXYGEN SATURATION: 99 % | WEIGHT: 176 LBS

## 2019-12-12 DIAGNOSIS — K27.9 PUD (PEPTIC ULCER DISEASE): Primary | ICD-10-CM

## 2019-12-12 DIAGNOSIS — Z12.5 SCREENING PSA (PROSTATE SPECIFIC ANTIGEN): ICD-10-CM

## 2019-12-12 DIAGNOSIS — G89.29 CHRONIC RIGHT SHOULDER PAIN: ICD-10-CM

## 2019-12-12 DIAGNOSIS — Z80.42 FAMILY HISTORY OF PROSTATE CANCER IN FATHER: ICD-10-CM

## 2019-12-12 DIAGNOSIS — N40.0 BENIGN PROSTATIC HYPERPLASIA, UNSPECIFIED WHETHER LOWER URINARY TRACT SYMPTOMS PRESENT: ICD-10-CM

## 2019-12-12 DIAGNOSIS — M25.511 CHRONIC RIGHT SHOULDER PAIN: ICD-10-CM

## 2019-12-12 PROCEDURE — 1123F ACP DISCUSS/DSCN MKR DOCD: CPT | Performed by: FAMILY MEDICINE

## 2019-12-12 PROCEDURE — 1036F TOBACCO NON-USER: CPT | Performed by: FAMILY MEDICINE

## 2019-12-12 PROCEDURE — G8427 DOCREV CUR MEDS BY ELIG CLIN: HCPCS | Performed by: FAMILY MEDICINE

## 2019-12-12 PROCEDURE — 99214 OFFICE O/P EST MOD 30 MIN: CPT | Performed by: FAMILY MEDICINE

## 2019-12-12 PROCEDURE — G8482 FLU IMMUNIZE ORDER/ADMIN: HCPCS | Performed by: FAMILY MEDICINE

## 2019-12-12 PROCEDURE — G8417 CALC BMI ABV UP PARAM F/U: HCPCS | Performed by: FAMILY MEDICINE

## 2019-12-12 PROCEDURE — 4040F PNEUMOC VAC/ADMIN/RCVD: CPT | Performed by: FAMILY MEDICINE

## 2019-12-12 RX ORDER — TERAZOSIN 5 MG/1
5 CAPSULE ORAL NIGHTLY
Qty: 90 CAPSULE | Refills: 3 | Status: SHIPPED | OUTPATIENT
Start: 2019-12-12

## 2019-12-12 RX ORDER — OMEPRAZOLE 20 MG/1
20 CAPSULE, DELAYED RELEASE ORAL DAILY
Qty: 90 CAPSULE | Refills: 3 | Status: SHIPPED | OUTPATIENT
Start: 2019-12-12 | End: 2022-10-12 | Stop reason: CLARIF

## 2019-12-12 ASSESSMENT — ENCOUNTER SYMPTOMS
COUGH: 0
SORE THROAT: 1
WHEEZING: 0
RHINORRHEA: 1
ABDOMINAL PAIN: 0
BACK PAIN: 0
BLOOD IN STOOL: 0
SHORTNESS OF BREATH: 0

## 2019-12-16 DIAGNOSIS — M25.511 CHRONIC RIGHT SHOULDER PAIN: ICD-10-CM

## 2019-12-16 DIAGNOSIS — G89.29 CHRONIC RIGHT SHOULDER PAIN: ICD-10-CM

## 2020-03-23 ENCOUNTER — TELEPHONE (OUTPATIENT)
Dept: FAMILY MEDICINE CLINIC | Age: 85
End: 2020-03-23

## 2022-10-12 ENCOUNTER — OFFICE VISIT (OUTPATIENT)
Dept: SURGERY | Age: 87
End: 2022-10-12
Payer: MEDICARE

## 2022-10-12 VITALS
WEIGHT: 170 LBS | TEMPERATURE: 97.4 F | OXYGEN SATURATION: 98 % | RESPIRATION RATE: 16 BRPM | DIASTOLIC BLOOD PRESSURE: 69 MMHG | SYSTOLIC BLOOD PRESSURE: 121 MMHG | HEIGHT: 69 IN | HEART RATE: 98 BPM | BODY MASS INDEX: 25.18 KG/M2

## 2022-10-12 DIAGNOSIS — R13.19 ESOPHAGEAL DYSPHAGIA: Primary | ICD-10-CM

## 2022-10-12 PROCEDURE — 4004F PT TOBACCO SCREEN RCVD TLK: CPT | Performed by: SURGERY

## 2022-10-12 PROCEDURE — G8427 DOCREV CUR MEDS BY ELIG CLIN: HCPCS | Performed by: SURGERY

## 2022-10-12 PROCEDURE — 99203 OFFICE O/P NEW LOW 30 MIN: CPT | Performed by: SURGERY

## 2022-10-12 PROCEDURE — G8484 FLU IMMUNIZE NO ADMIN: HCPCS | Performed by: SURGERY

## 2022-10-12 PROCEDURE — 1123F ACP DISCUSS/DSCN MKR DOCD: CPT | Performed by: SURGERY

## 2022-10-12 PROCEDURE — G8417 CALC BMI ABV UP PARAM F/U: HCPCS | Performed by: SURGERY

## 2022-10-12 NOTE — ASSESSMENT & PLAN NOTE
Patient having dysphagia for past 2 to 3 months. Based on symptoms question whether this may be a motility issue. However cannot rule out some sort of obstructive process or stricture. To begin work-up with esophagram to better characterize for any narrowing space filling lesions and to better assess esophageal motility. We will follow-up results. If no clear cause of his dysphagia is identified or if we need to investigate further with at that point plan for EGD. Patient is advised to eat small frequent meals and to drink plenty of liquids with meals as we try to work-up the cause of his dysphagia.

## 2022-10-12 NOTE — PROGRESS NOTES
April 18, 2018     Twin Kline MD  [id number] [address]    Patient: Lanette Augustin   YOB: 1947   Date of Visit: 4/18/2018       Dear Dr Arnulfo Manuel: Thank you for referring Lanette Augustin to me for evaluation  Below are my notes for this consultation  If you have questions, please do not hesitate to call me  I look forward to following your patient along with you  Sincerely,        Saurav Lyon MD        CC: No Recipients  Saurav Lyon MD  4/18/2018 12:40 PM  Sign at close encounter  Assessment/Plan:  1  BPH without obstructive symptoms  The patient has a normal PSA and acceptable DR BREA  He will continue yearly screening at his request despite being informed that American urologic Association guidelines  After age 79  No problem-specific Assessment & Plan notes found for this encounter  Diagnoses and all orders for this visit:    BPH without obstruction/lower urinary tract symptoms  -     POCT urine dip auto non-scope  -     PSA Total, Diagnostic; Future  -     Comprehensive metabolic panel; Future    Other orders  -     clonazePAM (KlonoPIN) 0 5 mg tablet; Take 1 tablet by mouth daily  -     fexofenadine (ALLEGRA) 60 MG tablet; 60 mg  -     sertraline (ZOLOFT) 100 mg tablet; Take 1 tablet by mouth daily  -     simvastatin (ZOCOR) 10 mg tablet; Take 1 tablet by mouth daily  -     Triamcinolone Acetonide 55 MCG/ACT AERO; 2 sprays into each nostril daily  -     multivitamin (THERAGRAN) TABS; Take 1 tablet by mouth daily          Subjective:      Patient ID: Lanette Augustin is a 79 y o  male  HPI 66-year-old male without any obstructive irritative voiding symptomatology without significant nocturia or urgency frequency dysuria gross hematuria incontinence or retention  The patient presents for yearly prostate exam   He is aware of the AUA guidelines and wishes to continue with this      The following portions of the patient's history were Subjective   Nicole Boyd is a 80 y.o. male who presents today for further evaluation of dysphagia. Patient is referred from PCP. Seems that for past few months the patient has been experiencing difficulty with swallowing and states that he has progressed to where anything that he eats or drinks feels like it goes down very slowly and sometimes even feels like it may be getting stuck for short periods of time in the chest.  Prior to a few months ago was not having any issues. Does not seem that that was progressive and is really not started with solids or and progressed to liquids but has just worsened with solids and liquids since onset. Denies any emesis but does report occasionally he feels like things want to come back up though they do not. During the same period of time he states that he has been having a lot of mucus drainage or sinus congestion and feels like he has a hoarse voice which has been persistent over the past couple months. He does report approximately 10 pounds of weight loss over the last 6 to 9 months unintentionally. Denies any significant changes in bowel movements. No difficulty breathing. No hematemesis. Patient does have a history of tobacco use and was a smoker for approximately 40 years but quit approximately 40 years ago. No significant alcohol use.     Past Medical History:   Diagnosis Date    Abnormal glucose level     Anemia     BPH (benign prostatic hyperplasia)     Diverticulosis     Fracture of left hip (HCC)     GERD (gastroesophageal reflux disease)     Leukoplakia of oral mucosa     Microscopic hematuria     PUD (peptic ulcer disease)        Past Surgical History:   Procedure Laterality Date    BUNIONECTOMY      CARDIAC CATHETERIZATION  2008    CATARACT REMOVAL  2010    COLONOSCOPY  2005    severe diverticulosis    COLONOSCOPY  07/2014    severe diverticulosis and GI bleed    CYSTOURETHROSCOPY  2006    [facility]    OTHER SURGICAL HISTORY  2008 biopsy-leukoplakia-buccal mucosa    REVISION TOTAL KNEE ARTHROPLASTY      TOTAL KNEE ARTHROPLASTY Right 10/2014    TOTAL KNEE ARTHROPLASTY Left 2016    UPPER GASTROINTESTINAL ENDOSCOPY      esophageal ulcer healed excision external hemorrhoids    UPPER GASTROINTESTINAL ENDOSCOPY  10/2014       Current Outpatient Medications   Medication Sig Dispense Refill    pantoprazole (PROTONIX) 40 MG tablet Take 40 mg by mouth daily      ketoconazole (NIZORAL) 2 % shampoo Apply topically Twice a Week Apply topically daily as needed. cetirizine (ZYRTEC) 10 MG tablet Take 10 mg by mouth as needed for Allergies      terazosin (HYTRIN) 5 MG capsule Take 1 capsule by mouth nightly 90 capsule 3    Misc Natural Products (GLUCOSAMINE CHONDROITIN ADV) TABS Take 1 tablet by mouth      Ascorbic Acid (VITAMIN C) 500 MG tablet Take 500 mg by mouth daily      Multiple Vitamins-Minerals (THERAPEUTIC MULTIVITAMIN-MINERALS) tablet Take 1 tablet by mouth daily       No current facility-administered medications for this visit.        Allergies   Allergen Reactions    Percocet [Oxycodone-Acetaminophen]      Nausea         Family History   Problem Relation Age of Onset    Cancer Father         pancreas    Cancer Brother         prostate       Social History     Socioeconomic History    Marital status:      Spouse name: Not on file    Number of children: Not on file    Years of education: Not on file    Highest education level: Not on file   Occupational History    Not on file   Tobacco Use    Smoking status: Former     Types: Cigarettes     Quit date: 6/15/1987     Years since quittin.3    Smokeless tobacco: Former     Quit date: 1997   Substance and Sexual Activity    Alcohol use: No    Drug use: No    Sexual activity: Not on file   Other Topics Concern    Not on file   Social History Narrative    Not on file     Social Determinants of Health     Financial Resource Strain: Not on file   Food Insecurity: Not on file reviewed and updated as appropriate: allergies, current medications, past family history, past medical history, past social history, past surgical history and problem list     Review of Systems   All other systems reviewed and are negative  Objective:      /78   Ht 5' 10" (1 778 m)   Wt 70 3 kg (155 lb)   BMI 22 24 kg/m²           Physical Exam   Constitutional: He is oriented to person, place, and time  He appears well-developed and well-nourished  No distress  HENT:   Head: Atraumatic  Eyes: EOM are normal    Neck: Neck supple  Pulmonary/Chest: Effort normal  No respiratory distress  Abdominal: Soft  Genitourinary:   Genitourinary Comments: Prostate approximately 30 g a nodular nontender  No rectal masses appreciated on RISHI with a normal anal sphincter tone and normal anal verge by visualization  Scrotum and phallus normal, uncircumcised no cutaneous lesions meatus patent and normally placed testes and adnexa palpably normal without masses or adnexal abnormality  Neurological: He is alert and oriented to person, place, and time  Psychiatric: He has a normal mood and affect   His behavior is normal  Judgment and thought content normal  Transportation Needs: Not on file   Physical Activity: Not on file   Stress: Not on file   Social Connections: Not on file   Intimate Partner Violence: Not on file   Housing Stability: Not on file       ROS:   Review of Systems - General ROS: negative  Psychological ROS: negative  Ophthalmic ROS: negative  ENT ROS: negative  Respiratory ROS: no cough, shortness of breath, or wheezing  Cardiovascular ROS: no chest pain or dyspnea on exertion  Gastrointestinal ROS: per HPI  Genito-Urinary ROS: no dysuria, trouble voiding, or hematuria  Musculoskeletal ROS: negative  Dermatological ROS: negative      Objective   Vitals:    10/12/22 1404   BP: 121/69   Pulse: 98   Resp: 16   Temp: 97.4 °F (36.3 °C)   SpO2: 98%     General:in no apparent distress and well developed and well nourished  Eyes: No gross abnormalities. Ears, Nose, Throat: hearing grossly normal bilaterally  Neck: neck supple and non tender without mass  Lungs: clear to auscultation without wheezes or rales   Heart: S1S2, no mumurs, RRR  Abdomen: Soft, nondistended, minimal tenderness to palpation in epigastrium but otherwise nontender, bowel sounds present  Extremity: negative  Neuro: CN II-XII grossly intact      1. Esophageal dysphagia  Assessment & Plan:  Patient having dysphagia for past 2 to 3 months. Based on symptoms question whether this may be a motility issue. However cannot rule out some sort of obstructive process or stricture. To begin work-up with esophagram to better characterize for any narrowing space filling lesions and to better assess esophageal motility. We will follow-up results. If no clear cause of his dysphagia is identified or if we need to investigate further with at that point plan for EGD. Patient is advised to eat small frequent meals and to drink plenty of liquids with meals as we try to work-up the cause of his dysphagia.          (Please note that portions of this note were completed with a voice recognition program.  Efforts were made to edit the dictations but occasionally words are mis-transcribed.)

## 2022-11-09 ENCOUNTER — OFFICE VISIT (OUTPATIENT)
Dept: SURGERY | Age: 87
End: 2022-11-09
Payer: MEDICARE

## 2022-11-09 VITALS
BODY MASS INDEX: 25.18 KG/M2 | HEART RATE: 96 BPM | TEMPERATURE: 97.8 F | DIASTOLIC BLOOD PRESSURE: 78 MMHG | OXYGEN SATURATION: 100 % | SYSTOLIC BLOOD PRESSURE: 160 MMHG | RESPIRATION RATE: 16 BRPM | WEIGHT: 170 LBS | HEIGHT: 69 IN

## 2022-11-09 DIAGNOSIS — C15.9 ESOPHAGEAL ADENOCARCINOMA (HCC): Primary | ICD-10-CM

## 2022-11-09 PROCEDURE — G8427 DOCREV CUR MEDS BY ELIG CLIN: HCPCS | Performed by: SURGERY

## 2022-11-09 PROCEDURE — 99214 OFFICE O/P EST MOD 30 MIN: CPT | Performed by: SURGERY

## 2022-11-09 PROCEDURE — G8417 CALC BMI ABV UP PARAM F/U: HCPCS | Performed by: SURGERY

## 2022-11-09 PROCEDURE — G8484 FLU IMMUNIZE NO ADMIN: HCPCS | Performed by: SURGERY

## 2022-11-09 PROCEDURE — 1036F TOBACCO NON-USER: CPT | Performed by: SURGERY

## 2022-11-09 PROCEDURE — 1123F ACP DISCUSS/DSCN MKR DOCD: CPT | Performed by: SURGERY

## 2022-11-09 NOTE — PROGRESS NOTES
Subjective   Aneesh Alarcon is a 80 y.o. male who presents today for follow-up evaluation after recent EGD for further evaluation of dysphagia. Since procedure patient denies any significant changes. Comes in today for pathology results and for further recommendations. Past Medical History:   Diagnosis Date    Abnormal glucose level     Anemia     BPH (benign prostatic hyperplasia)     Diverticulosis     Fracture of left hip (HCC)     GERD (gastroesophageal reflux disease)     Leukoplakia of oral mucosa     Microscopic hematuria     PUD (peptic ulcer disease)        Past Surgical History:   Procedure Laterality Date    BUNIONECTOMY      CARDIAC CATHETERIZATION  2008    CATARACT REMOVAL  2010    COLONOSCOPY  2005    severe diverticulosis    COLONOSCOPY  07/2014    severe diverticulosis and GI bleed    CYSTOURETHROSCOPY  2006    ESOPHAGOGASTRODUODENOSCOPY  10/31/2022    [facility], Dr. Perez Arellano  2008    biopsy-leukoplakia-buccal mucosa    REVISION TOTAL KNEE ARTHROPLASTY  2014    TOTAL KNEE ARTHROPLASTY Right 10/2014    TOTAL KNEE ARTHROPLASTY Left 01/2016    UPPER GASTROINTESTINAL ENDOSCOPY  2009    esophageal ulcer healed excision external hemorrhoids    UPPER GASTROINTESTINAL ENDOSCOPY  10/2014       Current Outpatient Medications   Medication Sig Dispense Refill    pantoprazole (PROTONIX) 40 MG tablet Take 40 mg by mouth daily      ketoconazole (NIZORAL) 2 % shampoo Apply topically Twice a Week Apply topically daily as needed.       cetirizine (ZYRTEC) 10 MG tablet Take 10 mg by mouth as needed for Allergies      terazosin (HYTRIN) 5 MG capsule Take 1 capsule by mouth nightly 90 capsule 3    Misc Natural Products (GLUCOSAMINE CHONDROITIN ADV) TABS Take 1 tablet by mouth      Ascorbic Acid (VITAMIN C) 500 MG tablet Take 500 mg by mouth daily      Multiple Vitamins-Minerals (THERAPEUTIC MULTIVITAMIN-MINERALS) tablet Take 1 tablet by mouth daily       No current facility-administered medications for this visit. Allergies   Allergen Reactions    Percocet [Oxycodone-Acetaminophen]      Nausea         Family History   Problem Relation Age of Onset    Cancer Father         pancreas    Cancer Brother         prostate       Social History     Socioeconomic History    Marital status:      Spouse name: Not on file    Number of children: Not on file    Years of education: Not on file    Highest education level: Not on file   Occupational History    Not on file   Tobacco Use    Smoking status: Former     Types: Cigarettes     Quit date: 6/15/1987     Years since quittin.4    Smokeless tobacco: Former     Quit date: 1997   Substance and Sexual Activity    Alcohol use: No    Drug use: No    Sexual activity: Not on file   Other Topics Concern    Not on file   Social History Narrative    Not on file     Social Determinants of Health     Financial Resource Strain: Not on file   Food Insecurity: Not on file   Transportation Needs: Not on file   Physical Activity: Not on file   Stress: Not on file   Social Connections: Not on file   Intimate Partner Violence: Not on file   Housing Stability: Not on file       ROS:   Review of Systems - General ROS: negative  Psychological ROS: negative  Ophthalmic ROS: negative  ENT ROS: negative  Respiratory ROS: no cough, shortness of breath, or wheezing  Cardiovascular ROS: no chest pain or dyspnea on exertion  Gastrointestinal ROS: no abdominal pain, change in bowel habits, or black or bloody stools  Genito-Urinary ROS: no dysuria, trouble voiding, or hematuria  Musculoskeletal ROS: negative  Dermatological ROS: negative      Objective   Vitals:    22 1028   BP: (!) 160/78   Pulse: 96   Resp: 16   Temp: 97.8 °F (36.6 °C)   SpO2: 100%     General:in no apparent distress and well developed and well nourished  Eyes: No gross abnormalities.   Ears, Nose, Throat: hearing grossly normal bilaterally  Neck: neck supple and non tender without mass  Lungs: clear to auscultation without wheezes or rales   Heart: S1S2, no mumurs, RRR  Abdomen: soft, nontender, no HSM, no guarding, no rebound, no masses  Extremity: negative  Neuro: CN II-XII grossly intact    Approx 35 minutes were spent in face to face consultation with pt today discussing diagnosis, further work up and treatment of esophageal cancer. 1. Esophageal adenocarcinoma ([facility] [address]  Assessment & Plan:  Have had a discussion with the patient today about the pathology findings and the diagnosis of adenocarcinoma of the esophagus. We discussed management of this type of cancer including medical treatment with chemotherapy as well as potential surgical treatments with resection. This is not a procedure that I performed that is performed at this hospital and so we will make a referral to thoracic surgeon to begin discussion of potential surgical options. I have also made referral to oncology to establish care and for further work-up and recommendations. We will go ahead and get PET CT scan to evaluate for any mediastinal lymph node involvement and also to evaluate for any distant metastatic disease. The patient will be given information on a dysphagia diet. I have discussed these things with him in order to try and minimize symptoms as we begin treatment planning. Orders:  -     PET CT WHOLE BODY;  Future         (Please note that portions of this note were completed with a voice recognition program.  Efforts were made to edit the dictations but occasionally words are mis-transcribed.)

## 2022-11-09 NOTE — ASSESSMENT & PLAN NOTE
Have had a discussion with the patient today about the pathology findings and the diagnosis of adenocarcinoma of the esophagus. We discussed management of this type of cancer including medical treatment with chemotherapy as well as potential surgical treatments with resection. This is not a procedure that I performed that is performed at this hospital and so we will make a referral to thoracic surgeon to begin discussion of potential surgical options. I have also made referral to oncology to establish care and for further work-up and recommendations. We will go ahead and get PET CT scan to evaluate for any mediastinal lymph node involvement and also to evaluate for any distant metastatic disease. The patient will be given information on a dysphagia diet. I have discussed these things with him in order to try and minimize symptoms as we begin treatment planning.

## 2022-12-01 DIAGNOSIS — C15.5 MALIGNANT NEOPLASM OF LOWER THIRD OF ESOPHAGUS (HCC): Primary | ICD-10-CM

## 2022-12-01 NOTE — PROGRESS NOTES
PET CT , [facility] called stating that they needed an more specific DX code for patient to have their PET scan approved. Clinical an, Op note, and pathology sent to [phone] with an updated PET CT order with specific DX code.